# Patient Record
Sex: MALE | Race: WHITE | NOT HISPANIC OR LATINO | Employment: OTHER | URBAN - METROPOLITAN AREA
[De-identification: names, ages, dates, MRNs, and addresses within clinical notes are randomized per-mention and may not be internally consistent; named-entity substitution may affect disease eponyms.]

---

## 2020-06-26 ENCOUNTER — LAB VISIT (OUTPATIENT)
Dept: PRIMARY CARE CLINIC | Facility: OTHER | Age: 70
End: 2020-06-26
Payer: MEDICARE

## 2020-06-26 DIAGNOSIS — Z03.818 ENCOUNTER FOR OBSERVATION FOR SUSPECTED EXPOSURE TO OTHER BIOLOGICAL AGENTS RULED OUT: ICD-10-CM

## 2020-06-26 PROCEDURE — U0003 INFECTIOUS AGENT DETECTION BY NUCLEIC ACID (DNA OR RNA); SEVERE ACUTE RESPIRATORY SYNDROME CORONAVIRUS 2 (SARS-COV-2) (CORONAVIRUS DISEASE [COVID-19]), AMPLIFIED PROBE TECHNIQUE, MAKING USE OF HIGH THROUGHPUT TECHNOLOGIES AS DESCRIBED BY CMS-2020-01-R: HCPCS

## 2020-06-30 LAB — SARS-COV-2 RNA RESP QL NAA+PROBE: NEGATIVE

## 2020-09-14 ENCOUNTER — OFFICE VISIT (OUTPATIENT)
Dept: GASTROENTEROLOGY | Facility: CLINIC | Age: 70
End: 2020-09-14
Payer: MEDICARE

## 2020-09-14 VITALS
HEIGHT: 68 IN | SYSTOLIC BLOOD PRESSURE: 153 MMHG | DIASTOLIC BLOOD PRESSURE: 84 MMHG | WEIGHT: 238.75 LBS | RESPIRATION RATE: 16 BRPM | HEART RATE: 73 BPM | BODY MASS INDEX: 36.19 KG/M2

## 2020-09-14 DIAGNOSIS — Z01.812 PRE-PROCEDURE LAB EXAM: ICD-10-CM

## 2020-09-14 DIAGNOSIS — K63.5 POLYP OF COLON, UNSPECIFIED PART OF COLON, UNSPECIFIED TYPE: Primary | ICD-10-CM

## 2020-09-14 DIAGNOSIS — K92.1 HEMATOCHEZIA: ICD-10-CM

## 2020-09-14 PROCEDURE — 99204 OFFICE O/P NEW MOD 45 MIN: CPT | Mod: S$PBB,,, | Performed by: INTERNAL MEDICINE

## 2020-09-14 PROCEDURE — 99999 PR PBB SHADOW E&M-EST. PATIENT-LVL III: CPT | Mod: PBBFAC,,, | Performed by: INTERNAL MEDICINE

## 2020-09-14 PROCEDURE — 99213 OFFICE O/P EST LOW 20 MIN: CPT | Mod: PBBFAC,PO | Performed by: INTERNAL MEDICINE

## 2020-09-14 PROCEDURE — 99204 PR OFFICE/OUTPT VISIT, NEW, LEVL IV, 45-59 MIN: ICD-10-PCS | Mod: S$PBB,,, | Performed by: INTERNAL MEDICINE

## 2020-09-14 PROCEDURE — 99999 PR PBB SHADOW E&M-EST. PATIENT-LVL III: ICD-10-PCS | Mod: PBBFAC,,, | Performed by: INTERNAL MEDICINE

## 2020-09-14 RX ORDER — SODIUM, POTASSIUM,MAG SULFATES 17.5-3.13G
1 SOLUTION, RECONSTITUTED, ORAL ORAL DAILY
Qty: 1 KIT | Refills: 0 | Status: SHIPPED | OUTPATIENT
Start: 2020-09-14 | End: 2020-09-16

## 2020-09-14 NOTE — PROGRESS NOTES
Subjective:       Patient ID: Surya Solares is a 69 y.o. male.    Chief Complaint: Colonoscopy (discuss)    Patient here today to establish care with aforementioned complaints.  Patient reports new onset bright red blood per rectum, characterized as painless bleeding with associated normal stool.  It does not occur with every bowel movement however does happen few times per month.  He denies significant constipation but does occasionally have issues with straining.  He typically has at least 1 bowel movement a day but sometimes a few.  He is not currently on anything to induce bowel movements.    His most recent colonoscopy was approximately 3 years ago.  He does recall having polyps removed, in addition to diverticulosis.  He was reportedly due later this year.  Surgical history otherwise significant for a kidney surgery.  He denies any family history of colon cancer.    No past medical history on file.    No past surgical history on file.    Social History  Social History     Tobacco Use    Smoking status: Not on file   Substance Use Topics    Alcohol use: Not on file    Drug use: Not on file       No family history on file.    Review of Systems   Constitutional: Negative for chills, fever and unexpected weight change.   HENT: Negative for congestion and trouble swallowing.    Eyes: Negative for photophobia and visual disturbance.   Respiratory: Negative for cough and shortness of breath.    Cardiovascular: Negative for chest pain and leg swelling.   Gastrointestinal: Positive for blood in stool and constipation. Negative for abdominal distention, abdominal pain, diarrhea, nausea and vomiting.   Genitourinary: Negative for dysuria and hematuria.   Musculoskeletal: Negative for arthralgias and myalgias.   Skin: Negative for color change and rash.   Neurological: Negative for dizziness, light-headedness and numbness.   Psychiatric/Behavioral: Negative for agitation and confusion.           Objective:       Physical Exam  Vitals signs and nursing note reviewed.   Constitutional:       Appearance: He is well-developed.   HENT:      Head: Normocephalic and atraumatic.   Eyes:      General: No scleral icterus.     Pupils: Pupils are equal, round, and reactive to light.   Neck:      Musculoskeletal: Normal range of motion and neck supple.   Cardiovascular:      Rate and Rhythm: Normal rate and regular rhythm.      Heart sounds: Normal heart sounds.   Pulmonary:      Effort: Pulmonary effort is normal. No respiratory distress.      Breath sounds: Normal breath sounds.   Abdominal:      General: Bowel sounds are normal. There is no distension.      Palpations: Abdomen is soft.      Tenderness: There is no abdominal tenderness.   Musculoskeletal: Normal range of motion.   Skin:     General: Skin is warm and dry.      Findings: No erythema or rash.   Neurological:      Mental Status: He is alert and oriented to person, place, and time.      Comments: No asterixis   Psychiatric:         Behavior: Behavior normal.           Pertinent labs and imaging studies reviewed    Assessment:       1. Polyp of colon, unspecified part of colon, unspecified type    2. Pre-procedure lab exam    3. Hematochezia        Plan:       Schedule colonoscopy    (Portions of this note were dictated using voice recognition software and may contain dictation related errors in spelling/grammar/syntax not found on text review)

## 2020-09-14 NOTE — PATIENT INSTRUCTIONS
SUPREP Instructions    [facility] [address]  Clinic Office [phone]  Endoscopy Lab [phone]    You are scheduled for a Colonoscopy procedure with Dr. Becerra on Oct. 12, 2020  at [facility].  You will check in at the Information desk on the first floor of the hospital. Please contact the office to reschedule if needed [phone].     A responsible adult (family member or friend) must come with you and transport you home.  You are not allowed to drive, take a taxi/ride share or bus or leave the Endoscopy Center alone.  If you do not have a responsible adult with you to take you home, your exam will be cancelled.      If you are on any blood thinners:   Please follow instructions of  if you are taking anticoagulant/blood thinning medications such as Aggrenox, Brilinta, Effient, Eliquis, Lovenox, Plavix, Pletal, Pradaxa, Ticilid, Xarelto or Coumadin.      If you have diabetes:   Please skip your morning dose of insulin or other oral medications for diabetes the morning of the procedure unless instructed otherwise by your doctor. You should take your blood pressure, heart, anti-rejection and or seizure medication the morning of your procedure.     To ensure that your test is accurate and complete, you must follow these instructions - please do not use the instructions provided from the pharmacy.      The Day Before The Procedure:     Follow a CLEAR LIQUID DIET for the entire day before your scheduled colonoscopy.  This means no solid food the entire day.   A clear liquid diet includes the following:  o Water, Coffee or decaffeinated coffee (no milk or cream)  o Tea, Herbal tea  o Carbonated beverages (soft drinks), regular and sugar free  o Gelatin  o Apple Juice, white grape juice, white cranberry juice  o Gatorade, Power Aid, Crystal Light, Chaitanya Aid lemon or lime flavor  o Lemonade and Limeade  o Bouillon, clear consomme' beef or  chicken  o Snowball, popsicles    DO NOT DRINK ANY LIQUIDS CONTAINING RED,PURPLE, BLUE OR ORANGE  DO NOT DRINK ANY LIQUIDS NOT SPECIFICALLY LISTED  DO NOT DRINK ALCOHOL     At 5 pm the evening before your colonoscopy:  o Pour one (1) bottle of SUPREP into the container provided in the box. Add water to the line on the container and mix well.  Drink the whole container and then drink 2 more containers of water over 1 hour.  - This is sometimes easier to drink if this solution is cold, so you can mix the solution 20 minutes ahead of time and place in the refrigerator prior to drinking.  You have to drink the solution within 30-45 minutes of mixing it.  Do NOT put this solution over ice.  It IS ok to drink with a straw.    The Day of the Procedure :   o The Endoscopy department will call you 2 days prior to your procedure to give you the exact time to take the second bottle which should be 5 hours prior to your ARRIVAL TIME (this may be in the middle of the night)  o Pour the 2nd bottle of SUPREP into the container provided in the box.  Add water to the line on the container and mix well.  Drink the whole container and then drink 2 more containers of water over 1 hour.    o AFTER YOU HAVE COMPLETED YOUR PREP, YOU MAY HAVE NOTHING ELSE BY MOUTH    o Please leave all valuables and jewelry at home.    o At 600 am, you may take the last dose of any medications you are allowed to take with a small sip of water (blood pressure, heart, anti-rejection and or seizure medication).  If you use inhalers bring them with you.    o You may call the Endoscopy department at [phone] with any questions regarding your procedure.    Covid 19 screening to be done 72 hours prior to your procedure. Please have this test done on Oct. 9. 2020 at 11:00am at Urgent Care located at [address] [place] [address]. Rosalie Jordan. [phone].

## 2020-09-24 ENCOUNTER — TELEPHONE (OUTPATIENT)
Dept: GASTROENTEROLOGY | Facility: CLINIC | Age: 70
End: 2020-09-24

## 2020-09-24 DIAGNOSIS — Z01.812 PRE-PROCEDURE LAB EXAM: ICD-10-CM

## 2020-10-25 ENCOUNTER — LAB VISIT (OUTPATIENT)
Dept: URGENT CARE | Facility: CLINIC | Age: 70
End: 2020-10-25
Payer: MEDICARE

## 2020-10-25 DIAGNOSIS — Z01.812 PRE-PROCEDURE LAB EXAM: ICD-10-CM

## 2020-10-25 PROCEDURE — U0003 INFECTIOUS AGENT DETECTION BY NUCLEIC ACID (DNA OR RNA); SEVERE ACUTE RESPIRATORY SYNDROME CORONAVIRUS 2 (SARS-COV-2) (CORONAVIRUS DISEASE [COVID-19]), AMPLIFIED PROBE TECHNIQUE, MAKING USE OF HIGH THROUGHPUT TECHNOLOGIES AS DESCRIBED BY CMS-2020-01-R: HCPCS

## 2020-10-26 ENCOUNTER — TELEPHONE (OUTPATIENT)
Dept: GASTROENTEROLOGY | Facility: CLINIC | Age: 70
End: 2020-10-26

## 2020-10-26 ENCOUNTER — TELEPHONE (OUTPATIENT)
Dept: ENDOSCOPY | Facility: HOSPITAL | Age: 70
End: 2020-10-26

## 2020-10-26 LAB — SARS-COV-2 RNA RESP QL NAA+PROBE: NOT DETECTED

## 2020-10-26 NOTE — TELEPHONE ENCOUNTER
Left message instructing patient to call dept @ [phone] between 8am-4pm.    Arrival time to be given @ 0845  Colon/Suprep  (Message sent via [facility] portal)

## 2020-10-26 NOTE — TELEPHONE ENCOUNTER
----- Message from Danna Lowry sent at 10/26/2020  3:53 PM CDT -----  Contact: [phone]/Self  Type: Requesting to speak with nurse    Who Called: Pt  Regarding: procedure instructions    Would the patient rather a call back or a response via [facility]? Call back  Best Call Back Number: [phone]  Additional Information:

## 2020-10-28 ENCOUNTER — ANESTHESIA (OUTPATIENT)
Dept: ENDOSCOPY | Facility: HOSPITAL | Age: 70
End: 2020-10-28
Payer: OTHER GOVERNMENT

## 2020-10-28 ENCOUNTER — HOSPITAL ENCOUNTER (OUTPATIENT)
Facility: HOSPITAL | Age: 70
Discharge: HOME | End: 2020-10-28
Attending: INTERNAL MEDICINE | Admitting: INTERNAL MEDICINE
Payer: OTHER GOVERNMENT

## 2020-10-28 ENCOUNTER — ANESTHESIA EVENT (OUTPATIENT)
Dept: ENDOSCOPY | Facility: HOSPITAL | Age: 70
End: 2020-10-28
Payer: OTHER GOVERNMENT

## 2020-10-28 VITALS
HEART RATE: 71 BPM | OXYGEN SATURATION: 96 % | SYSTOLIC BLOOD PRESSURE: 144 MMHG | TEMPERATURE: 98 F | BODY MASS INDEX: 34.86 KG/M2 | WEIGHT: 230 LBS | RESPIRATION RATE: 18 BRPM | DIASTOLIC BLOOD PRESSURE: 62 MMHG | HEIGHT: 68 IN

## 2020-10-28 DIAGNOSIS — K63.5 COLON POLYP: ICD-10-CM

## 2020-10-28 DIAGNOSIS — K63.5 POLYP OF COLON, UNSPECIFIED PART OF COLON, UNSPECIFIED TYPE: Primary | ICD-10-CM

## 2020-10-28 PROCEDURE — 25000003 PHARM REV CODE 250: Performed by: INTERNAL MEDICINE

## 2020-10-28 PROCEDURE — 00811 ANES LWR INTST NDSC NOS: CPT | Performed by: INTERNAL MEDICINE

## 2020-10-28 PROCEDURE — 88305 TISSUE EXAM BY PATHOLOGIST: CPT | Mod: 26,,, | Performed by: PATHOLOGY

## 2020-10-28 PROCEDURE — 37000009 HC ANESTHESIA EA ADD 15 MINS: Performed by: INTERNAL MEDICINE

## 2020-10-28 PROCEDURE — 63600175 PHARM REV CODE 636 W HCPCS: Performed by: NURSE ANESTHETIST, CERTIFIED REGISTERED

## 2020-10-28 PROCEDURE — 27201089 HC SNARE, DISP (ANY): Performed by: INTERNAL MEDICINE

## 2020-10-28 PROCEDURE — 88305 TISSUE EXAM BY PATHOLOGIST: CPT | Performed by: PATHOLOGY

## 2020-10-28 PROCEDURE — 37000008 HC ANESTHESIA 1ST 15 MINUTES: Performed by: INTERNAL MEDICINE

## 2020-10-28 PROCEDURE — 45385 COLONOSCOPY W/LESION REMOVAL: CPT | Performed by: INTERNAL MEDICINE

## 2020-10-28 PROCEDURE — 25000003 PHARM REV CODE 250: Performed by: NURSE ANESTHETIST, CERTIFIED REGISTERED

## 2020-10-28 PROCEDURE — 45385 PR COLONOSCOPY,REMV LESN,SNARE: ICD-10-PCS | Mod: PT,,, | Performed by: INTERNAL MEDICINE

## 2020-10-28 PROCEDURE — 45385 COLONOSCOPY W/LESION REMOVAL: CPT | Mod: PT,,, | Performed by: INTERNAL MEDICINE

## 2020-10-28 PROCEDURE — 88305 TISSUE EXAM BY PATHOLOGIST: ICD-10-PCS | Mod: 26,,, | Performed by: PATHOLOGY

## 2020-10-28 RX ORDER — LIDOCAINE HCL/PF 100 MG/5ML
SYRINGE (ML) INTRAVENOUS
Status: DISCONTINUED | OUTPATIENT
Start: 2020-10-28 | End: 2020-10-28

## 2020-10-28 RX ORDER — SODIUM CHLORIDE 9 MG/ML
INJECTION, SOLUTION INTRAVENOUS CONTINUOUS
Status: DISCONTINUED | OUTPATIENT
Start: 2020-10-28 | End: 2020-10-28 | Stop reason: HOSPADM

## 2020-10-28 RX ORDER — AMLODIPINE BESYLATE 5 MG/1
10 TABLET ORAL DAILY
Status: ON HOLD | COMMUNITY
End: 2022-02-22 | Stop reason: HOSPADM

## 2020-10-28 RX ORDER — PROPOFOL 10 MG/ML
VIAL (ML) INTRAVENOUS CONTINUOUS PRN
Status: DISCONTINUED | OUTPATIENT
Start: 2020-10-28 | End: 2020-10-28

## 2020-10-28 RX ORDER — SODIUM CHLORIDE 0.9 % (FLUSH) 0.9 %
10 SYRINGE (ML) INJECTION
Status: DISCONTINUED | OUTPATIENT
Start: 2020-10-28 | End: 2020-10-28 | Stop reason: HOSPADM

## 2020-10-28 RX ORDER — PROPOFOL 10 MG/ML
VIAL (ML) INTRAVENOUS
Status: DISCONTINUED | OUTPATIENT
Start: 2020-10-28 | End: 2020-10-28

## 2020-10-28 RX ADMIN — PROPOFOL 130 MG: 10 INJECTION, EMULSION INTRAVENOUS at 10:10

## 2020-10-28 RX ADMIN — PROPOFOL 150 MCG/KG/MIN: 10 INJECTION, EMULSION INTRAVENOUS at 10:10

## 2020-10-28 RX ADMIN — SODIUM CHLORIDE: 0.9 INJECTION, SOLUTION INTRAVENOUS at 10:10

## 2020-10-28 RX ADMIN — LIDOCAINE HYDROCHLORIDE 100 MG: 20 INJECTION, SOLUTION INTRAVENOUS at 10:10

## 2020-10-28 NOTE — TRANSFER OF CARE
"Anesthesia Transfer of Care Note    Patient: Surya Solares Jr.    Procedure(s) Performed: Procedure(s) (LRB):  COLONOSCOPY (N/A)    Patient location: GI    Anesthesia Type: MAC    Transport from OR: Transported from OR on room air with adequate spontaneous ventilation    Post pain: adequate analgesia    Post assessment: no apparent anesthetic complications    Post vital signs: stable    Level of consciousness: awake    Nausea/Vomiting: no nausea/vomiting    Complications: none    Transfer of care protocol was followed      Last vitals:   Visit Vitals  BP (!) 171/84 (BP Location: Left arm, Patient Position: Lying)   Pulse 81   Temp 37.1 °C (98.8 °F) (Skin)   Resp 20   Ht 5' 8" (1.727 m)   Wt 104.3 kg (230 lb)   SpO2 96%   BMI 34.97 kg/m²     "

## 2020-10-28 NOTE — H&P
"Short Stay Endoscopy History and Physical    PCP - Primary Doctor No    Procedure - Colonoscopy  ASA - II  Mallampati - per anesthesia  History of Anesthesia problems - no  Family history Anesthesia problems - no     HPI:  This is a 70 y.o. male here for evaluation of : Colon polyps      ROS:  Constitutional: No fevers, chills, No weight loss  ENT: No allergies  CV: No chest pain  Pulm: No shortness of breath  GI: see HPI  Derm: No rash    Medical History:  has no past medical history on file.    Surgical History:  has a past surgical history that includes Kidney surgery (Left, 2016).    Family History: family history is not on file.. Otherwise no colon cancer, inflammatory bowel disease, or GI malignancies.    Social History:  reports that he has never smoked. He has never used smokeless tobacco. He reports previous alcohol use. He reports that he does not use drugs.    Review of patient's allergies indicates:   Allergen Reactions    Penicillins Swelling       Medications:   Medications Prior to Admission   Medication Sig Dispense Refill Last Dose    amLODIPine (NORVASC) 5 MG tablet Take 10 mg by mouth once daily. Patient unsure of dosing when asked. He stated he "thinks it's 10mg per day"   10/27/2020 at 2100         Objective Findings:    Vital Signs: see nursing notes  Physical Exam:  General Appearance: Well appearing in no acute distress  Eyes:    No scleral icterus  ENT: Neck supple  Lungs: CTA anteriorly  Heart:  S1, S2 normal, no murmurs heard  Abdomen: Soft, non tender, non distended with positive bowel sounds. No hepatosplenomegaly, ascites, or mass  Extremities: no edema  Skin: No rash      Labs:  No results found for: WBC, HGB, HCT, PLT, CHOL, TRIG, HDL, LDLDIRECT, ALT, AST, NA, K, CL, CREATININE, BUN, CO2, TSH, PSA, INR, GLUF, HGBA1C, MICROALBUR    I have explained the risks and benefits of endoscopy procedures to the patient including but not limited to bleeding, perforation, infection, and " death.    Mark Becerra MD

## 2020-10-28 NOTE — ANESTHESIA PREPROCEDURE EVALUATION
10/28/2020  Surya Solares Jr. is a 70 y.o., male for colonoscopy under MAC      Past Surgical History:   Procedure Laterality Date    KIDNEY SURGERY Left 2016         Anesthesia Evaluation    I have reviewed the Patient Summary Reports.   I have reviewed the NPO Status.   I have reviewed the Medications.     Review of Systems  Social:  Non-Smoker    Cardiovascular:   Hypertension        Physical Exam  General:  Obesity    Airway/Jaw/Neck:  Airway Findings: Mallampati: II      Chest/Lungs:  Chest/Lungs Clear    Heart/Vascular:  Heart Findings: Normal            Anesthesia Plan  Type of Anesthesia, risks & benefits discussed:  Anesthesia Type:  MAC  Patient's Preference:   Intra-op Monitoring Plan: standard ASA monitors  Intra-op Monitoring Plan Comments:   Post Op Pain Control Plan: multimodal analgesia  Post Op Pain Control Plan Comments:   Induction:    Beta Blocker:  Patient is not currently on a Beta-Blocker (No further documentation required).       Informed Consent: Patient understands risks and agrees with Anesthesia plan.  Questions answered. Anesthesia consent signed with patient.  ASA Score: 2     Day of Surgery Review of History & Physical:            Ready For Surgery From Anesthesia Perspective.

## 2020-10-28 NOTE — ANESTHESIA POSTPROCEDURE EVALUATION
Anesthesia Post Evaluation    Patient: Surya Solares JrLeydi    Procedure(s) Performed: Procedure(s) (LRB):  COLONOSCOPY (N/A)    Final Anesthesia Type: MAC    Patient location during evaluation: GI PACU  Patient participation: Yes- Able to Participate  Level of consciousness: awake and alert  Post-procedure vital signs: reviewed and stable  Pain management: adequate  Airway patency: patent    PONV status at discharge: No PONV  Anesthetic complications: no      Cardiovascular status: blood pressure returned to baseline and hemodynamically stable  Respiratory status: unassisted, spontaneous ventilation and room air  Hydration status: euvolemic  Follow-up not needed.          Vitals Value Taken Time   /64 10/28/20 1102   Temp 98 10/28/20 1102   Pulse 70 10/28/20 1102   Resp 14 10/28/20 1102   SpO2 96 10/28/20 1102         No case tracking events are documented in the log.      Pain/Emmy Score: No data recorded

## 2020-10-28 NOTE — [employer] PATIENT INSTRUCTIONS
Discharge Summary/Instructions after an Endoscopic Procedure  Patient Name: Surya Solares  Patient MRN: [id number]  Patient YOB: 1950 Wednesday, October 28, 2020  Mark Becerra MD  Your health is very important to us during the Covid Crisis. Following your   procedure today, you will receive a daily text for 2 weeks asking about   signs or symptoms of Covid 19.  Please respond to this text when you   receive it so we can follow up and keep you as safe as possible.   RESTRICTIONS:  During your procedure today, you received medications for sedation.  These   medications may affect your judgment, balance and coordination.  Therefore,   for 24 hours, you have the following restrictions:   - DO NOT drive a car, operate machinery, make legal/financial decisions,   sign important papers or drink alcohol.    ACTIVITY:  Today: no heavy lifting, straining or running due to procedural   sedation/anesthesia.  The following day: return to full activity including work.  DIET:  Eat and drink normally unless instructed otherwise.     TREATMENT FOR COMMON SIDE EFFECTS:  - Mild abdominal pain, nausea, belching, bloating or excessive gas:  rest,   eat lightly and use a heating pad.  - Sore Throat: treat with throat lozenges and/or gargle with warm salt   water.  - Because air was used during the procedure, expelling large amounts of air   from your rectum or belching is normal.  - If a bowel prep was taken, you may not have a bowel movement for 1-3 days.    This is normal.  SYMPTOMS TO WATCH FOR AND REPORT TO YOUR PHYSICIAN:  1. Abdominal pain or bloating, other than gas cramps.  2. Chest pain.  3. Back pain.  4. Signs of infection such as: chills or fever occurring within 24 hours   after the procedure.  5. Rectal bleeding, which would show as bright red, maroon, or black stools.   (A tablespoon of blood from the rectum is not serious, especially if   hemorrhoids are present.)  6. Vomiting.  7. Weakness or  dizziness.  GO DIRECTLY TO THE NEAREST EMERGENCY ROOM IF YOU HAVE ANY OF THE FOLLOWING:      Difficulty breathing              Chills and/or fever over 101 F   Persistent vomiting and/or vomiting blood   Severe abdominal pain   Severe chest pain   Black, tarry stools   Bleeding- more than one tablespoon   Any other symptom or condition that you feel may need urgent attention  Your doctor recommends these additional instructions:  If any biopsies were taken, your doctors clinic will contact you in 1 to 2   weeks with any results.  - Discharge patient to home.   - High fiber diet.   - Continue present medications.   - Await pathology results.   - Repeat colonoscopy in 5 years for surveillance.   - Patient has a contact number available for emergencies.  The signs and   symptoms of potential delayed complications were discussed with the   patient.  Return to normal activities tomorrow.  Written discharge   instructions were provided to the patient.  For questions, problems or results please call your physician - Mark Becerra MD.  EMERGENCY PHONE NUMBER: [phone],  LAB RESULTS: [phone]  IF A COMPLICATION OR EMERGENCY SITUATION ARISES AND YOU ARE UNABLE TO REACH   YOUR PHYSICIAN - GO DIRECTLY TO THE EMERGENCY ROOM.  Mark Becerra MD  10/28/2020 10:58:51 AM  This report has been verified and signed electronically.  [employer]

## 2020-10-30 LAB
FINAL PATHOLOGIC DIAGNOSIS: NORMAL
GROSS: NORMAL
Lab: NORMAL

## 2020-11-04 NOTE — PROGRESS NOTES
Pathology from recent colonoscopy reviewed.  Colon polyp(s) benign.  Repeat colonoscopy in 5 years.

## 2021-04-16 ENCOUNTER — PATIENT MESSAGE (OUTPATIENT)
Dept: RESEARCH | Facility: HOSPITAL | Age: 71
End: 2021-04-16

## 2022-02-21 ENCOUNTER — HOSPITAL ENCOUNTER (OUTPATIENT)
Facility: HOSPITAL | Age: 72
Discharge: HOME | End: 2022-02-22
Attending: EMERGENCY MEDICINE | Admitting: INTERNAL MEDICINE
Payer: MEDICARE

## 2022-02-21 DIAGNOSIS — I16.0 HYPERTENSIVE URGENCY: Primary | ICD-10-CM

## 2022-02-21 DIAGNOSIS — R42 DIZZINESS: ICD-10-CM

## 2022-02-21 DIAGNOSIS — I10 SEVERE ESSENTIAL HYPERTENSION: ICD-10-CM

## 2022-02-21 DIAGNOSIS — F43.10 PTSD (POST-TRAUMATIC STRESS DISORDER): ICD-10-CM

## 2022-02-21 DIAGNOSIS — R07.9 CHEST PAIN, UNSPECIFIED TYPE: ICD-10-CM

## 2022-02-21 DIAGNOSIS — R07.9 CHEST PAIN: ICD-10-CM

## 2022-02-21 DIAGNOSIS — Z86.718 HISTORY OF DVT OF LOWER EXTREMITY: ICD-10-CM

## 2022-02-21 LAB
ALBUMIN SERPL BCP-MCNC: 3.9 G/DL (ref 3.5–5.2)
ALP SERPL-CCNC: 78 U/L (ref 55–135)
ALT SERPL W/O P-5'-P-CCNC: 32 U/L (ref 10–44)
ANION GAP SERPL CALC-SCNC: 9 MMOL/L (ref 8–16)
AST SERPL-CCNC: 35 U/L (ref 10–40)
BASOPHILS # BLD AUTO: 0.05 K/UL (ref 0–0.2)
BASOPHILS NFR BLD: 0.6 % (ref 0–1.9)
BILIRUB SERPL-MCNC: 0.5 MG/DL (ref 0.1–1)
BNP SERPL-MCNC: 32 PG/ML (ref 0–99)
BUN SERPL-MCNC: 14 MG/DL (ref 8–23)
CALCIUM SERPL-MCNC: 8.9 MG/DL (ref 8.7–10.5)
CHLORIDE SERPL-SCNC: 106 MMOL/L (ref 95–110)
CO2 SERPL-SCNC: 25 MMOL/L (ref 23–29)
CREAT SERPL-MCNC: 1.2 MG/DL (ref 0.5–1.4)
DIFFERENTIAL METHOD: ABNORMAL
EOSINOPHIL # BLD AUTO: 0.2 K/UL (ref 0–0.5)
EOSINOPHIL NFR BLD: 2.4 % (ref 0–8)
ERYTHROCYTE [DISTWIDTH] IN BLOOD BY AUTOMATED COUNT: 14.6 % (ref 11.5–14.5)
EST. GFR  (AFRICAN AMERICAN): >60 ML/MIN/1.73 M^2
EST. GFR  (NON AFRICAN AMERICAN): >60 ML/MIN/1.73 M^2
GLUCOSE SERPL-MCNC: 96 MG/DL (ref 70–110)
HCT VFR BLD AUTO: 45.7 % (ref 40–54)
HGB BLD-MCNC: 15.5 G/DL (ref 14–18)
IMM GRANULOCYTES # BLD AUTO: 0.04 K/UL (ref 0–0.04)
IMM GRANULOCYTES NFR BLD AUTO: 0.5 % (ref 0–0.5)
LYMPHOCYTES # BLD AUTO: 2 K/UL (ref 1–4.8)
LYMPHOCYTES NFR BLD: 23.7 % (ref 18–48)
MCH RBC QN AUTO: 29.4 PG (ref 27–31)
MCHC RBC AUTO-ENTMCNC: 33.9 G/DL (ref 32–36)
MCV RBC AUTO: 87 FL (ref 82–98)
MONOCYTES # BLD AUTO: 0.7 K/UL (ref 0.3–1)
MONOCYTES NFR BLD: 8.7 % (ref 4–15)
NEUTROPHILS # BLD AUTO: 5.4 K/UL (ref 1.8–7.7)
NEUTROPHILS NFR BLD: 64.1 % (ref 38–73)
NRBC BLD-RTO: 0 /100 WBC
PLATELET # BLD AUTO: 205 K/UL (ref 150–450)
PMV BLD AUTO: 12.1 FL (ref 9.2–12.9)
POTASSIUM SERPL-SCNC: 4.9 MMOL/L (ref 3.5–5.1)
PROT SERPL-MCNC: 7.9 G/DL (ref 6–8.4)
RBC # BLD AUTO: 5.27 M/UL (ref 4.6–6.2)
SODIUM SERPL-SCNC: 140 MMOL/L (ref 136–145)
TROPONIN I SERPL DL<=0.01 NG/ML-MCNC: 0.01 NG/ML (ref 0–0.03)
TROPONIN I SERPL DL<=0.01 NG/ML-MCNC: 0.01 NG/ML (ref 0–0.03)
WBC # BLD AUTO: 8.4 K/UL (ref 3.9–12.7)

## 2022-02-21 PROCEDURE — 96374 THER/PROPH/DIAG INJ IV PUSH: CPT

## 2022-02-21 PROCEDURE — 25000003 PHARM REV CODE 250: Performed by: EMERGENCY MEDICINE

## 2022-02-21 PROCEDURE — 93005 ELECTROCARDIOGRAM TRACING: CPT

## 2022-02-21 PROCEDURE — 99285 EMERGENCY DEPT VISIT HI MDM: CPT | Mod: 25

## 2022-02-21 PROCEDURE — 93010 EKG 12-LEAD: ICD-10-PCS | Mod: 76,,, | Performed by: INTERNAL MEDICINE

## 2022-02-21 PROCEDURE — 83880 ASSAY OF NATRIURETIC PEPTIDE: CPT | Performed by: EMERGENCY MEDICINE

## 2022-02-21 PROCEDURE — 85025 COMPLETE CBC W/AUTO DIFF WBC: CPT | Performed by: EMERGENCY MEDICINE

## 2022-02-21 PROCEDURE — 93010 ELECTROCARDIOGRAM REPORT: CPT | Mod: ,,, | Performed by: INTERNAL MEDICINE

## 2022-02-21 PROCEDURE — 84484 ASSAY OF TROPONIN QUANT: CPT | Performed by: EMERGENCY MEDICINE

## 2022-02-21 PROCEDURE — 80053 COMPREHEN METABOLIC PANEL: CPT | Performed by: EMERGENCY MEDICINE

## 2022-02-21 RX ORDER — HYDRALAZINE HYDROCHLORIDE 20 MG/ML
10 INJECTION INTRAMUSCULAR; INTRAVENOUS
Status: COMPLETED | OUTPATIENT
Start: 2022-02-21 | End: 2022-02-22

## 2022-02-21 RX ORDER — ASPIRIN 325 MG
325 TABLET ORAL
Status: COMPLETED | OUTPATIENT
Start: 2022-02-21 | End: 2022-02-21

## 2022-02-21 RX ORDER — NAPROXEN SODIUM 220 MG/1
162 TABLET, FILM COATED ORAL DAILY
Status: DISCONTINUED | OUTPATIENT
Start: 2022-02-22 | End: 2022-02-22 | Stop reason: HOSPADM

## 2022-02-21 RX ADMIN — ASPIRIN 325 MG ORAL TABLET 325 MG: 325 PILL ORAL at 06:02

## 2022-02-22 VITALS
WEIGHT: 245.81 LBS | BODY MASS INDEX: 37.25 KG/M2 | HEIGHT: 68 IN | RESPIRATION RATE: 18 BRPM | SYSTOLIC BLOOD PRESSURE: 165 MMHG | DIASTOLIC BLOOD PRESSURE: 79 MMHG | HEART RATE: 90 BPM | TEMPERATURE: 99 F | OXYGEN SATURATION: 96 %

## 2022-02-22 PROBLEM — F43.10 PTSD (POST-TRAUMATIC STRESS DISORDER): Status: ACTIVE | Noted: 2022-02-22

## 2022-02-22 PROBLEM — I10 SEVERE ESSENTIAL HYPERTENSION: Status: ACTIVE | Noted: 2022-02-22

## 2022-02-22 PROBLEM — Z86.718 HISTORY OF DVT OF LOWER EXTREMITY: Status: ACTIVE | Noted: 2022-02-22

## 2022-02-22 LAB
ANION GAP SERPL CALC-SCNC: 8 MMOL/L (ref 8–16)
BUN SERPL-MCNC: 15 MG/DL (ref 8–23)
CALCIUM SERPL-MCNC: 8.9 MG/DL (ref 8.7–10.5)
CHLORIDE SERPL-SCNC: 109 MMOL/L (ref 95–110)
CHOLEST SERPL-MCNC: 171 MG/DL (ref 120–199)
CHOLEST/HDLC SERPL: 4.9 {RATIO} (ref 2–5)
CO2 SERPL-SCNC: 26 MMOL/L (ref 23–29)
CREAT SERPL-MCNC: 1.1 MG/DL (ref 0.5–1.4)
CTP QC/QA: YES
EST. GFR  (AFRICAN AMERICAN): >60 ML/MIN/1.73 M^2
EST. GFR  (NON AFRICAN AMERICAN): >60 ML/MIN/1.73 M^2
ESTIMATED AVG GLUCOSE: 120 MG/DL (ref 68–131)
GLUCOSE SERPL-MCNC: 98 MG/DL (ref 70–110)
HBA1C MFR BLD: 5.8 % (ref 4–5.6)
HDLC SERPL-MCNC: 35 MG/DL (ref 40–75)
HDLC SERPL: 20.5 % (ref 20–50)
LDLC SERPL CALC-MCNC: 106.2 MG/DL (ref 63–159)
MAGNESIUM SERPL-MCNC: 2.1 MG/DL (ref 1.6–2.6)
NONHDLC SERPL-MCNC: 136 MG/DL
PHOSPHATE SERPL-MCNC: 3.1 MG/DL (ref 2.7–4.5)
POCT GLUCOSE: 122 MG/DL (ref 70–110)
POCT GLUCOSE: 92 MG/DL (ref 70–110)
POTASSIUM SERPL-SCNC: 3.6 MMOL/L (ref 3.5–5.1)
SARS-COV-2 RDRP RESP QL NAA+PROBE: NEGATIVE
SODIUM SERPL-SCNC: 143 MMOL/L (ref 136–145)
T4 FREE SERPL-MCNC: 0.93 NG/DL (ref 0.71–1.51)
TRIGL SERPL-MCNC: 149 MG/DL (ref 30–150)
TSH SERPL DL<=0.005 MIU/L-ACNC: 5.55 UIU/ML (ref 0.4–4)

## 2022-02-22 PROCEDURE — G0378 HOSPITAL OBSERVATION PER HR: HCPCS

## 2022-02-22 PROCEDURE — 80061 LIPID PANEL: CPT | Performed by: STUDENT IN AN ORGANIZED HEALTH CARE EDUCATION/TRAINING PROGRAM

## 2022-02-22 PROCEDURE — 84439 ASSAY OF FREE THYROXINE: CPT | Performed by: STUDENT IN AN ORGANIZED HEALTH CARE EDUCATION/TRAINING PROGRAM

## 2022-02-22 PROCEDURE — 84100 ASSAY OF PHOSPHORUS: CPT | Performed by: STUDENT IN AN ORGANIZED HEALTH CARE EDUCATION/TRAINING PROGRAM

## 2022-02-22 PROCEDURE — 80048 BASIC METABOLIC PNL TOTAL CA: CPT | Performed by: STUDENT IN AN ORGANIZED HEALTH CARE EDUCATION/TRAINING PROGRAM

## 2022-02-22 PROCEDURE — 25000003 PHARM REV CODE 250: Performed by: EMERGENCY MEDICINE

## 2022-02-22 PROCEDURE — 94761 N-INVAS EAR/PLS OXIMETRY MLT: CPT

## 2022-02-22 PROCEDURE — U0002 COVID-19 LAB TEST NON-CDC: HCPCS | Performed by: STUDENT IN AN ORGANIZED HEALTH CARE EDUCATION/TRAINING PROGRAM

## 2022-02-22 PROCEDURE — 83036 HEMOGLOBIN GLYCOSYLATED A1C: CPT | Performed by: STUDENT IN AN ORGANIZED HEALTH CARE EDUCATION/TRAINING PROGRAM

## 2022-02-22 PROCEDURE — 25000003 PHARM REV CODE 250: Performed by: STUDENT IN AN ORGANIZED HEALTH CARE EDUCATION/TRAINING PROGRAM

## 2022-02-22 PROCEDURE — 84443 ASSAY THYROID STIM HORMONE: CPT | Performed by: STUDENT IN AN ORGANIZED HEALTH CARE EDUCATION/TRAINING PROGRAM

## 2022-02-22 PROCEDURE — 83735 ASSAY OF MAGNESIUM: CPT | Performed by: STUDENT IN AN ORGANIZED HEALTH CARE EDUCATION/TRAINING PROGRAM

## 2022-02-22 PROCEDURE — 63600175 PHARM REV CODE 636 W HCPCS: Performed by: EMERGENCY MEDICINE

## 2022-02-22 RX ORDER — IBUPROFEN 200 MG
16 TABLET ORAL
Status: DISCONTINUED | OUTPATIENT
Start: 2022-02-22 | End: 2022-02-22 | Stop reason: HOSPADM

## 2022-02-22 RX ORDER — AMLODIPINE BESYLATE 5 MG/1
10 TABLET ORAL DAILY
Status: DISCONTINUED | OUTPATIENT
Start: 2022-02-22 | End: 2022-02-22 | Stop reason: HOSPADM

## 2022-02-22 RX ORDER — SODIUM CHLORIDE 0.9 % (FLUSH) 0.9 %
10 SYRINGE (ML) INJECTION EVERY 12 HOURS PRN
Status: DISCONTINUED | OUTPATIENT
Start: 2022-02-22 | End: 2022-02-22 | Stop reason: HOSPADM

## 2022-02-22 RX ORDER — ENOXAPARIN SODIUM 100 MG/ML
40 INJECTION SUBCUTANEOUS EVERY 24 HOURS
Status: DISCONTINUED | OUTPATIENT
Start: 2022-02-22 | End: 2022-02-22

## 2022-02-22 RX ORDER — AMLODIPINE BESYLATE 10 MG/1
10 TABLET ORAL DAILY
Qty: 30 TABLET | Refills: 11 | Status: SHIPPED | OUTPATIENT
Start: 2022-02-23 | End: 2023-02-23

## 2022-02-22 RX ORDER — NALOXONE HCL 0.4 MG/ML
0.02 VIAL (ML) INJECTION
Status: DISCONTINUED | OUTPATIENT
Start: 2022-02-22 | End: 2022-02-22 | Stop reason: HOSPADM

## 2022-02-22 RX ORDER — GLUCAGON 1 MG
1 KIT INJECTION
Status: DISCONTINUED | OUTPATIENT
Start: 2022-02-22 | End: 2022-02-22 | Stop reason: HOSPADM

## 2022-02-22 RX ORDER — IBUPROFEN 200 MG
24 TABLET ORAL
Status: DISCONTINUED | OUTPATIENT
Start: 2022-02-22 | End: 2022-02-22 | Stop reason: HOSPADM

## 2022-02-22 RX ADMIN — AMLODIPINE BESYLATE 10 MG: 5 TABLET ORAL at 09:02

## 2022-02-22 RX ADMIN — HYDRALAZINE HYDROCHLORIDE 10 MG: 20 INJECTION INTRAMUSCULAR; INTRAVENOUS at 12:02

## 2022-02-22 RX ADMIN — ASPIRIN 81 MG CHEWABLE TABLET 162 MG: 81 TABLET CHEWABLE at 09:02

## 2022-02-22 NOTE — PLAN OF CARE
"  Problem: Adult Inpatient Plan of Care  Goal: Plan of Care Review  Outcome: Ongoing, Progressing     Patient arrived to unit from ED via wheelchair. Pt oriented to room, plan of care explained, pt verbalized understanding. Pt is AAO x 4, denies SOB and pain, no distress noted. Pt is NSR on Tele, no true red alarms. Pt informed Virtual Nurse of swallowing issues, went in to assess and pt stated that he" chokes ONLY when eating spicy foods and it just started all of a sudden". Pt has no further complaints at this time. Offered pt a popsicle, pt tolerated well. Pt is resting, bed in lowest position, HOB elevated, side rails up x 2, call light and bedside table within reach. Pt instructed to call if assistance is needed.   "

## 2022-02-22 NOTE — ED NOTES
Pt is A & O x 3, appropriate and resting comfortably. Pt denies respiratory distress, respirations are even and unlabored. Skin is warm and dry w/ pink mucosa. VS.  Will continue to monitor closely.

## 2022-02-22 NOTE — PLAN OF CARE
Problem: Adult Inpatient Plan of Care  Goal: Plan of Care Review  2/22/2022 1508 by Lisa Phillips RN  Outcome: Met  2/22/2022 0704 by Lisa Phillips RN  Outcome: Ongoing, Progressing  Goal: Patient-Specific Goal (Individualized)  Outcome: Met  Goal: Absence of Hospital-Acquired Illness or Injury  Outcome: Met  Goal: Optimal Comfort and Wellbeing  Outcome: Met  Goal: Readiness for Transition of Care  Outcome: Met     Problem: Hypertension Acute  Goal: Blood Pressure Within Desired Range  Outcome: Met     Problem: Fall Injury Risk  Goal: Absence of Fall and Fall-Related Injury  Outcome: Met

## 2022-02-22 NOTE — ED NOTES
Pt resting w/ eyes closed and in no obvious distress. Respirations are even and unlabored. Skin is warm, dry and pink. VS. = rise and fall of chest wall noted. Will not disturb. Will continue to monitor closely.

## 2022-02-22 NOTE — DISCHARGE INSTRUCTIONS
Patient came in with uncontrolled HTN and had chest pain. Cardiac enzymes normal, EKG with LVH but no ischemic changes. Labs unremarkable, radiology as below. Put back on oral antihypertensive medications with resolution of symptoms.       Imaging Results              X-Ray Chest PA And Lateral (Final result)  Result time 02/21/22 20:12:00      Final result by Mike Hernandez MD (02/21/22 20:12:00)                   Impression:      1. Mild bilateral basilar subsegmental atelectasis, no large focal consolidation.      Electronically signed by: Mike Hernandez MD  Date:    02/21/2022  Time:    20:12               Narrative:    EXAMINATION:  XR CHEST PA AND LATERAL    CLINICAL HISTORY:  Chest Pain;    TECHNIQUE:  PA and lateral views of the chest were performed.    COMPARISON:  None    FINDINGS:  The cardiomediastinal silhouette is not enlarged.  There is no pleural effusion.  The trachea is midline.  The lungs are symmetrically expanded bilaterally with mildly coarse interstitial attenuation noting bilateral basilar subsegmental atelectasis.  Left subclavian stent noted..  No large focal consolidation seen.  There is no pneumothorax.  The osseous structures are remarkable for degenerative changes..

## 2022-02-22 NOTE — DISCHARGE SUMMARY
hospitals Hospital Medicine Discharge Summary    Primary Team: hospitals Hospitalist Team A  Attending Physician: Laura Pierce MD  Resident: Dr. Li  Intern: Dr. Mcgregor    Date of Admit: 2/21/2022  Date of Discharge: 2/22/2022    Discharge to: home  Condition: stable    Discharge Diagnoses     Patient Active Problem List   Diagnosis    Colon polyp    Severe essential hypertension    PTSD (post-traumatic stress disorder)    History of DVT of lower extremity    Chest pain       Consultants and Procedures     Consultants:  none    Procedures:   none    Imaging:  CXR: L subclavian stent, mild bilateral basilar subsegmental atelectasis.  No effusion, pneumothorax, or consolidation.    Brief History of Present Illness      Surya Solares Jr. is a 71 y.o. male with past medical history of HTN, hisotyr of DVTs, and PTSD. The patient presented to [facility] on 2/21/2022 with a primary complaint of chest discomfort and light headedness x 1 day.     The patient was in their usual state of health until day of presentation pt was sitting down when he began to experience a sensation in his chest that felt like his heart stopped beating. This episode was associated with lightheadedness and a hot flash with radiation to his face and resolved within 10 minutes. He denied any chest pain/pressure or tightness and denies any SOB. [facility] area visiting with his parents who are both chronically ill. Since arrival to the ED he denied any additional episodes of chest discomfort. He did not take home amlodipine prior to episode but states he is normally compliant with his medications. He denies any fever/chills, headaches, chagnes in vision, syncope, n/v, abdominal pain, dysuria.  Pt reports similar episode in the past roughly 20 years ago when he was diagnosed with HTN and started on medications at that time. He was restarted on his home amlodipine with improvement in blood  pressure.  He was discharged with amlodipine prescription and close follow-up with PCP.    For the full HPI please refer to the History & Physical from this admission.    Hospital Course By Problem with Pertinent Findings        Severe hypertension w/out evidence of end organ damage  -pt presents with episode of chest discomfort associated with light headedness/hot flash with radiation to his face that occurred at rest and resolved within 10 minutes. Denies any chest pain/pressure or tightness and denies any SOB. Pt did not take home BP medications prior to episode.  -ED vitals: afebrile, HR 83, /101, RR 18, O2 sats 98% on RA  -EKG with NSR and t-wave inversions in lead 1 and aVL  -trop 0.012 x 2, BNP 32  -electrolytes and LFTs wnl  -s/p asa 325mg in the ED  -CXR with mild bilateral atelectasis  -BP elevated as high as 220s systolic in ED, improved with hydralazine 10mg in ED  -continued home BP medication amlodipine 10mg daily with improvement in BP  -continuous telemetry   -cardiac diet     History of DVT  -Pt with history of lower extremity DVT in his 40's and more recently a catheter-associated VTE with subsequent stent placement of left subclavian stenosis which later developed in-stent thrombosis while on Xarelto  -continue home xarelto 20mg daily       [facility]  [facility]     Health Care Maintenance  - Ordered TSH, A1c, and lipid panel.  - Influenza, Covid x 2, Tdap, and pneumococcal vaccines up to date.  - Colonoscopy: UTD      Discharge Medications        Medication List      START taking these medications    rivaroxaban 20 mg Tab  Commonly known as: XARELTO  Take 1 tablet (20 mg total) by mouth daily with dinner or evening meal.        CHANGE how you take these medications    amLODIPine 10 MG tablet  Commonly known as: NORVASC  Take 1 tablet (10 mg total) by mouth once daily.  Start taking on: February 23, 2022  What changed:   · medication strength  · additional instructions        STOP  taking these medications    APIXABAN ORAL           Where to Get Your Medications      These medications were sent to [facility]  [address] [id number] [id number]    Hours: Mon-Fri, 8a-5:30p Phone: [phone]   · amLODIPine 10 MG tablet  · rivaroxaban 20 mg Tab         Discharge Information:   Diet:  Cardiac    Physical Activity:  As tolerated             Instructions:  1. Take all medications as prescribed  2. Keep all follow-up appointments  3. Return to the hospital or call your primary care physicians if any worsening symptoms such as fever, chest pain, shortness of breath, return of symptoms, or any other concerns.    Follow-Up Appointments:  Primary Care    Rochelle Mcgregor MD  [facility], [facility]

## 2022-02-22 NOTE — H&P
[facility] Medicine H&P Note     Admitting Team: [facility] Hospitalist Team A  Attending Physician: Laura Pierce MD  Resident: Guillermo  Intern: J Carlos    Date of Admit: 2/21/2022    Chief Complaint     Chest discomfort and light headedness x 1 day    Subjective:      History of Present Illness:  Surya Solares Jr. is a 71 y.o. male with past medical history of HTN, hisotyr of DVTs, and PTSD. The patient presented to [facility] on 2/21/2022 with a primary complaint of chest discomfort and light headedness x 1 day.    The patient was in their usual state of health until day of presentation pt was sitting down when he began to experience a sensation in his chest that felt like his heart stopped beating. This episode was associated with lightheadedness and a hot flash with radiation to his face and resolved within 10 minutes. He denied any chest pain/pressure or tightness and denies any SOB. [facility] the area visiting with his parents who are both chronically ill. Since arrival to the ED he denies any additional episodes of chest discomfort. He did not take home amlodipine prior to episode but states he is normally compliant with his medications. He denies any fever/chills, headaches, chagnes in vision, syncope, n/v, abdominal pain, dysuria.    Pt reports similar episode in the past roughly 20 years ago when he was diagnosed with HTN and started on medications at that time.       Past Medical History:  Past Medical History:   Diagnosis Date    Hypertension        Past Surgical History:  Past Surgical History:   Procedure Laterality Date    COLONOSCOPY N/A 10/28/2020    Procedure: COLONOSCOPY;  Surgeon: Mark Becerra MD;  Location: [facility];  Service: Endoscopy;  Laterality: N/A;    KIDNEY SURGERY Left 2016   Cholecystectomy     Allergies:  Review of patient's allergies indicates:   Allergen Reactions    Penicillins Swelling       Home  "Medications:  Prior to Admission medications    Medication Sig Start Date End Date Taking? Authorizing Provider   amLODIPine (NORVASC) 5 MG tablet Take 10 mg by mouth once daily. Patient unsure of dosing when asked. He stated he "thinks it's 10mg per day"    Historical Provider       Family History:  No family history     Social History:  Social History     Tobacco Use    Smoking status: Never Smoker    Smokeless tobacco: Never Used   Substance Use Topics    Alcohol use: Not Currently    Drug use: Never       Review of Systems:  Pertinent items are noted in HPI. All other systems are reviewed and are negative.    Health Maintaince :   Primary Care Physician: unknown    Immunizations:   TDap UTD  Flu UTD  Pna UTD  Covid UTD x 2    Cancer Screening:  Colonoscopy: UTD     Objective:   Last 24 Hour Vital Signs:  BP  Min: 154/66  Max: 224/105  Temp  Av.1 °F (36.7 °C)  Min: 98 °F (36.7 °C)  Max: 98.1 °F (36.7 °C)  Pulse  Av.7  Min: 64  Max: 87  Resp  Av.8  Min: 15  Max: 27  SpO2  Av.5 %  Min: 92 %  Max: 98 %  Height  Av' 8" (172.7 cm)  Min: 5' 8" (172.7 cm)  Max: 5' 8" (172.7 cm)  Weight  Av.4 kg (250 lb)  Min: 113.4 kg (250 lb)  Max: 113.4 kg (250 lb)  Body mass index is 38.01 kg/m².  No intake/output data recorded.    Physical Examination:  Examination  General: Patient sitting comfortably in NAD, obese  HEENT: normocephalic, atraumatic, PERRL, EOMI  Neck: No thyromegaly or masses, unable to assess JVP secondary to body habitus   Cardiac: RRR, no murmurs appreciated, no extra heart sounds  Pulmonary/Chest: CTAB, symmetric chest rise, no wheezing or crackles  GI: Soft, non tender, non distended, normoactive bowel sounds  Extremities: mild LE edema to ankle bilaterally, no clubbing or cyanosis  Skin: dry, warm, intact. No bruising or rashes.  Neuro: Alert and oriented, moving all extremities, sensation equal and symmetric     Laboratory:  Most Recent Data:  CBC:   Lab Results   Component " Value Date    WBC 8.40 02/21/2022    HGB 15.5 02/21/2022    HCT 45.7 02/21/2022     02/21/2022    MCV 87 02/21/2022    RDW 14.6 (H) 02/21/2022     BMP:   Lab Results   Component Value Date     02/21/2022    K 4.9 02/21/2022     02/21/2022    CO2 25 02/21/2022    BUN 14 02/21/2022    CREATININE 1.2 02/21/2022    GLU 96 02/21/2022    CALCIUM 8.9 02/21/2022     LFTs:   Lab Results   Component Value Date    PROT 7.9 02/21/2022    ALBUMIN 3.9 02/21/2022    BILITOT 0.5 02/21/2022    AST 35 02/21/2022    ALKPHOS 78 02/21/2022    ALT 32 02/21/2022     Coags: No results found for: INR, PROTIME, PTT  FLP: No results found for: CHOL, HDL, LDLCALC, TRIG, CHOLHDL  DM:   Lab Results   Component Value Date    CREATININE 1.2 02/21/2022     Thyroid: No results found for: TSH, FREET4, G2ZLLZZ, Y5CWZJK, THYROIDAB  Anemia: No results found for: IRON, TIBC, FERRITIN, [id number], FOLATE  Cardiac:   Lab Results   Component Value Date    TROPONINI 0.012 02/21/2022    BNP 32 02/21/2022     Urinalysis: No results found for: LABURIN, COLORU, PHUA, CLARITYU, SPECGRAV, LABSPEC, NITRITE, PROTEINUR, GLUCOSEU, KETONESU, UROBILINOGEN, BILIRUBINUR, BLOODU, RBCU, WBCUA    Trended Lab Data:  Recent Labs   Lab 02/21/22 1853   WBC 8.40   HGB 15.5   HCT 45.7      MCV 87   RDW 14.6*      K 4.9      CO2 25   BUN 14   CREATININE 1.2   GLU 96   PROT 7.9   ALBUMIN 3.9   BILITOT 0.5   AST 35   ALKPHOS 78   ALT 32       Trended Cardiac Data:  Recent Labs   Lab 02/21/22  1853 02/21/22 2109   TROPONINI 0.012 0.012   BNP 32  --        Microbiology Data:  none    Other Results:  EKG (my interpretation): NSR, t-wave inversions in lead 1 and aVL    Radiology:  Imaging Results          X-Ray Chest PA And Lateral (Final result)  Result time 02/21/22 20:12:00    Final result by Mike Hernandez MD (02/21/22 20:12:00)                 Impression:      1. Mild bilateral basilar subsegmental atelectasis, no large focal  consolidation.      Electronically signed by: Mike Hernandez MD  Date:    02/21/2022  Time:    20:12             Narrative:    EXAMINATION:  XR CHEST PA AND LATERAL    CLINICAL HISTORY:  Chest Pain;    TECHNIQUE:  PA and lateral views of the chest were performed.    COMPARISON:  None    FINDINGS:  The cardiomediastinal silhouette is not enlarged.  There is no pleural effusion.  The trachea is midline.  The lungs are symmetrically expanded bilaterally with mildly coarse interstitial attenuation noting bilateral basilar subsegmental atelectasis.  Left subclavian stent noted..  No large focal consolidation seen.  There is no pneumothorax.  The osseous structures are remarkable for degenerative changes..                                 Assessment:     Surya Solares Jr. is a 71 y.o. male with past medical history of HTN, hisotyr of DVTs, and PTSD. The patient presented to [facility] on 2/21/2022 with a primary complaint of chest discomfort and light headedness x 1 day. Pt will be admitted to [facility] medicine for severe hypertension management.     Plan:     Severe hypertension w/out evidence of end organ damage  -pt presents with episode of chest discomfort associated with light headedness/hot flash with radiation to his face that occurred at rest and resolved within 10 minutes. Denies any chest pain/pressure or tightness and denies any SOB. Pt did not take home BP medications prior to episode.  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  - Influenza, Covid x 2, Tdap, and pneumococcal vaccines up to date.  - Colonoscopy: UTD       Code Status: Full  DVT Prophylaxis: xarelto  Diet: Cardiac  Disposition: admit to [facility] medicine for hypertensive urgency management     Shanell Whitman MD  [facility] Internal [facility] Resident, -I    [facility] Medicine Hospitalist Pager numbers:   [facility] Hospitalist Medicine Team A (Alexis/Stan): [phone]  [facility] Hospitalist Medicine Team B (Maria A/Viivane):  [phone]

## 2022-02-22 NOTE — ED PROVIDER NOTES
Encounter Date: 2/21/2022       History     Chief Complaint   Patient presents with    Dizziness     Pt has started with light headedness about 30 min ago, also has intermitted CP that comes and goes, Pt does have HTN in triage 211/106      HPI   Surya is a 71-year-old male with past medical history of hypertension who presents with complaint of lightheadedness and chest pain that started around 4:30 p.m..  He also had some shortness of breath associated.  The symptoms did not last for too long, less than about half an hour.  When he measured his blood pressure at home it was very elevated.  He had another episode of chest pain when walking up the steps to the emergency department.  He took his home dose of amlodipine prior to coming to the emergency department.  He states that the chest pain and lightheadedness have currently resolved.  He states it has been many years since he has had significant cardiac testing done.  [facility]  Review of patient's allergies indicates:   Allergen Reactions    Penicillins Swelling     No past medical history on file.  Past Surgical History:   Procedure Laterality Date    COLONOSCOPY N/A 10/28/2020    Procedure: COLONOSCOPY;  Surgeon: Mark Becerra MD;  Location: [facility];  Service: Endoscopy;  Laterality: N/A;    KIDNEY SURGERY Left 2016     No family history on file.  Social History     Tobacco Use    Smoking status: Never Smoker    Smokeless tobacco: Never Used   Substance Use Topics    Alcohol use: Not Currently    Drug use: Never     Review of Systems  Constitutional: Denies fever  HENT: Denies sore throat  Eyes: Denies eye pain  Respiratory: + brief shortness of breath  CV: + chest pain  GI: Denies abdominal pain, vomiting  MSK: Denies joint pain  Skin: Denies rash  Neuro: Denies headache    Physical Exam     Initial Vitals   BP Pulse Resp Temp SpO2   02/21/22 1752 02/21/22 1750 02/21/22 1750 02/21/22 1750 02/21/22 1750   (!)  190/101 83 18 98.1 °F (36.7 °C) 98 %      MAP       --                Physical Exam  General: Awake and alert, well-nourished  HENT: moist mucous membranes  Eyes: No conjunctival injection  Pulm: CTAB, no increased work of breathing  CV: Regular rate and rhythm, no murmur noted  Abdomen: Nondistended, non-tender to palpation  MSK: No LE edema  Skin: No rash noted  Neuro: No facial asymmetry, 5/5 strength of bilateral knee flexion/extension, normal bilateral arm strength.  Psychiatric: Cooperative    ED Course   Procedures  Labs Reviewed   CBC W/ AUTO DIFFERENTIAL - Abnormal; Notable for the following components:       Result Value    RDW 14.6 (*)     All other components within normal limits   COMPREHENSIVE METABOLIC PANEL   TROPONIN I   TROPONIN I   B-TYPE NATRIURETIC PEPTIDE          Imaging Results          X-Ray Chest PA And Lateral (Final result)  Result time 02/21/22 20:12:00    Final result by Mike Hernandez MD (02/21/22 20:12:00)                 Impression:      1. Mild bilateral basilar subsegmental atelectasis, no large focal consolidation.      Electronically signed by: Mike Hernandez MD  Date:    02/21/2022  Time:    20:12             Narrative:    EXAMINATION:  XR CHEST PA AND LATERAL    CLINICAL HISTORY:  Chest Pain;    TECHNIQUE:  PA and lateral views of the chest were performed.    COMPARISON:  None    FINDINGS:  The cardiomediastinal silhouette is not enlarged.  There is no pleural effusion.  The trachea is midline.  The lungs are symmetrically expanded bilaterally with mildly coarse interstitial attenuation noting bilateral basilar subsegmental atelectasis.  Left subclavian stent noted..  No large focal consolidation seen.  There is no pneumothorax.  The osseous structures are remarkable for degenerative changes..                                 Medications   aspirin chewable tablet 162 mg (has no administration in time range)   aspirin tablet 325 mg (325 mg Oral Given 2/21/22 5780)    hydrALAZINE injection 10 mg (10 mg Intravenous Given 2/22/22 0034)       Initial Assessment  Well appearing, severe htn, otherwise reassuring vitals, no chest pain currently.        Differential Diagnosis  ACS, hypertensive emergency, arrhythmia, CHF, PE unlikely, JERALD, electrolyte abnormality        EKG independent interpretation:  Initial EKG shows sinus rhythm, normal axis, normal intervals, no significant ST segment changes.  Repeat EKG shows sinus rhythm, slightly leftward axis, T-wave inversions in the lateral leads, overall this is dynamic ST segment changes compared to initial EKG.  No STEMI.        ED Management  CBC and CMP reassuring, BNP and troponin x2 reassuring.  Repeat EKG shows dynamic ST segment changes.  Patient was given a dose of hydralazine.  HEART score is 5.  Admitted for further cardiac workup.  He was in stable condition at time of admission.                             Clinical Impression:   Final diagnoses:  [R07.9] Chest pain  [R42] Dizziness                 Jimmie Ann MD  02/22/22 6373

## 2022-02-22 NOTE — PLAN OF CARE
SW met with pt via NUOFFER to discuss dc planning.    Pt confirmed information on his chart. Pt reported that he resides in his home alone. Pt is independent in ADLs. Pt reported no HH services. Pt reported no DME. Pt reported that upon dc one of his family members will come to pick him up. SW made pt aware to contact her with any questions or concerns. SW will continue to follow pt throughout his transitions of care and assist with any dc needs.     [facility] Pt reported not knowing his doctor name because they switch up. Pt reported that he has an appointment on March 2, 2022.        02/22/22 0077   Discharge Assessment   Assessment Type Discharge Planning Assessment   Confirmed/corrected address, phone number and insurance Yes   Confirmed Demographics Correct on Facesheet   Source of Information patient   Communicated CRYS with patient/caregiver Yes   Reason For Admission Severe essential hypertension   Lives With alone   Do you expect to return to your current living situation? Yes   Do you have help at home or someone to help you manage your care at home? No   Prior to hospitilization cognitive status: Alert/Oriented   Current cognitive status: Alert/Oriented   Walking or Climbing Stairs Difficulty none   Dressing/Bathing Difficulty none   Home Layout Able to live on 1st floor   Equipment Currently Used at Home none   Readmission within 30 days? No   Patient currently being followed by outpatient case management? No   Do you currently have service(s) that help you manage your care at home? No   Do you take prescription medications? Yes   Do you have prescription coverage? Yes   Coverage Medicare   Do you have any problems affording any of your prescribed medications? No   Is the patient taking medications as prescribed? yes   Who is going to help you get home at discharge? Family   How do you get to doctors appointments? family or friend will provide   Are you on dialysis? No   Do  you take coumadin? No   Discharge Plan A Home   Discharge Plan B Home with family   DME Needed Upon Discharge    (TBD)   Discharge Plan discussed with: Patient   Discharge Barriers Identified None

## 2022-02-22 NOTE — PROGRESS NOTES
[facility]                    Pharmacy       Discharge Medication Education    Patient ACCEPTED medication education. Pharmacy has provided education on the name, indication, and possible side effects of the medication(s) prescribed, using teach-back method.     The following medications have also been discussed, during this admission.        Medication List        START taking these medications      rivaroxaban 20 mg Tab  Commonly known as: XARELTO  Take 1 tablet (20 mg total) by mouth daily with dinner or evening meal.            CHANGE how you take these medications      amLODIPine 10 MG tablet  Commonly known as: NORVASC  Take 1 tablet (10 mg total) by mouth once daily.  Start taking on: February 23, 2022  What changed:   medication strength  additional instructions            STOP taking these medications      APIXABAN ORAL               Where to Get Your Medications        These medications were sent to [facility]  [address], [place] [address]      Hours: Mon-Fri, 8a-5:30p Phone: [phone]   amLODIPine 10 MG tablet  rivaroxaban 20 mg Tab          Thank you  Randy Castaneda, PharmD  [phone]   
[facility]           Pharmacy        Current Drug Shortage     Due to national backorder and [facility] is critically low on inventory of Dextrose 50% (D50) Syringes and Vials, pharmacy has automatically switched from D50% to D10% IVPB at the equivalent dose until resolution of the shortage per P&T approved protocol.               Juliana Villar, PharmD  [phone]    
65.8

## 2022-02-22 NOTE — ED NOTES
Pt lying L lateral recumbent, resting w/ eyes closed and in no distress. Respirations are even and unlabored. Skin is warm, dry and pink. VS. Will not disturb. Will continue to monitor closely.

## 2022-02-22 NOTE — ED NOTES
Pt sitting up on stretcher, A & O x 3. No distress noted. Respirations are even and unlabored, no nasal flaring and no use of accessory muscles. Skin is warm, dry and pink. VS. B/P is elevated, pt and daughter watching B/P monitor very closely. Monitor turned to the wall as to not be a distraction. Will continue to monitor closely.

## 2022-02-22 NOTE — ED NOTES
Pt resting w/ eyes closed, easily arousable and in no obvious distress. Pt is A & O x 3, denies SOB, fever, chills and N/V/D. No respiratory distress observed, respirations are even and unlabored. Skin is warm, dry and pink. VS. Will continue to monitor closely.

## 2022-02-22 NOTE — PLAN OF CARE
Pt will dc home with no needs noted.     Pt family member will transport him home upon dc.     SW will continue to follow pt throughout his transitions of care and assist with any dc needs.     [facility] Pt reported not knowing his doctor name because they switch up. Pt reported that he has an appointment on March 2, 2022.        02/22/22 1240   Final Note   Assessment Type Final Discharge Note   Anticipated Discharge Disposition Home   What phone number can be called within the next 1-3 days to see how you are doing after discharge? [phone]   Hospital Resources/Appts/Education Provided Appointments scheduled by Navigator/Coordinator   Post-Acute Status   Discharge Delays None known at this time

## 2022-02-22 NOTE — ED NOTES
Rec'd report from [facility]. Pt is in semi-fowlers position in bed w/o complaint at this time. Pt denies pain/discomfort. Pt is A & O x 3, denies SOB, respiratory distress and respirations are even and unlabored. Skin is warm and dry w/ pink mucosa. VS. Bed is locked and in the low position w/ the side rails up and locked for safety. Call bell and daughter @ BS. Will continue to monitor closely.

## 2023-03-06 NOTE — PLAN OF CARE
Problem: Adult Inpatient Plan of Care  Goal: Plan of Care Review  Outcome: Ongoing, Progressing   VN cued into room to complete admit assessment. VIP model introduced; VN working alongside bedside treatment team.  Plan of care reviewed with patient. Patient informed of fall risk, fall precautions, call light within reach, side rails x2 elevated. Patient notified to ask staff for assistance. Patient verbalized complete understanding. Time allowed for questions. Will continue to monitor and intervene as needed. Chart review complete.   18